# Patient Record
Sex: FEMALE | Race: OTHER | Employment: STUDENT | URBAN - METROPOLITAN AREA
[De-identification: names, ages, dates, MRNs, and addresses within clinical notes are randomized per-mention and may not be internally consistent; named-entity substitution may affect disease eponyms.]

---
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
## 2025-01-19 ENCOUNTER — HOSPITAL ENCOUNTER (OUTPATIENT)
Age: 20
Discharge: HOME | End: 2025-01-19
Payer: COMMERCIAL

## 2025-01-19 ENCOUNTER — APPOINTMENT (OUTPATIENT)
Dept: GENERAL RADIOLOGY | Age: 20
End: 2025-01-19
Attending: NURSE PRACTITIONER
Payer: COMMERCIAL

## 2025-01-19 VITALS
HEIGHT: 63 IN | BODY MASS INDEX: 19.49 KG/M2 | RESPIRATION RATE: 20 BRPM | HEART RATE: 103 BPM | OXYGEN SATURATION: 99 % | SYSTOLIC BLOOD PRESSURE: 126 MMHG | WEIGHT: 110 LBS | TEMPERATURE: 99 F | DIASTOLIC BLOOD PRESSURE: 83 MMHG

## 2025-01-19 DIAGNOSIS — R05.1 ACUTE COUGH: ICD-10-CM

## 2025-01-19 DIAGNOSIS — R68.83 CHILLS: ICD-10-CM

## 2025-01-19 DIAGNOSIS — J11.1 INFLUENZA: Primary | ICD-10-CM

## 2025-01-19 LAB
POCT INFLUENZA A: POSITIVE
POCT INFLUENZA B: NEGATIVE
SARS-COV-2 RNA RESP QL NAA+PROBE: NOT DETECTED

## 2025-01-19 PROCEDURE — 87502 INFLUENZA DNA AMP PROBE: CPT | Performed by: NURSE PRACTITIONER

## 2025-01-19 PROCEDURE — 99214 OFFICE O/P EST MOD 30 MIN: CPT | Performed by: NURSE PRACTITIONER

## 2025-01-19 PROCEDURE — 71046 X-RAY EXAM CHEST 2 VIEWS: CPT | Performed by: NURSE PRACTITIONER

## 2025-01-19 PROCEDURE — U0002 COVID-19 LAB TEST NON-CDC: HCPCS | Performed by: NURSE PRACTITIONER

## 2025-01-19 RX ORDER — ALBUTEROL SULFATE 90 UG/1
2 INHALANT RESPIRATORY (INHALATION) EVERY 4 HOURS PRN
COMMUNITY
Start: 2024-04-01

## 2025-01-19 NOTE — DISCHARGE INSTRUCTIONS
Increase water intake 2-3 liters daily   Your dose of acetaminophen (Tylenol) is 650 mg every 4 hours for pain or fevers as needed.     Your dose of ibuprofen is 400 mg every 6 hours for pain or fevers as needed.        Replace your toothbrush

## 2025-01-19 NOTE — ED PROVIDER NOTES
Patient Seen in: [facility]      History     Chief Complaint   Patient presents with    Cough/URI     Stated Complaint: cough; chest pain; head pressure. Pneumonia exposure.    Subjective:   HPI      19-year-old female presents today with complaints of chills cough and chest discomfort with head pressure that began last night.  Patient states that her brother was diagnosed with pneumonia yesterday.    Objective:     Past Medical History:    Asthma (HCC)    Bell's palsy              History reviewed. No pertinent surgical history.             No pertinent social history.            Review of Systems   Constitutional:  Positive for chills.   HENT:  Positive for congestion and sinus pressure.    Eyes: Negative.    Respiratory:  Positive for cough.    Cardiovascular: Negative.    Gastrointestinal: Negative.    Endocrine: Negative.    Genitourinary: Negative.    Musculoskeletal: Negative.    Skin: Negative.    Allergic/Immunologic: Negative.    Neurological: Negative.    Hematological: Negative.    Psychiatric/Behavioral: Negative.         Positive for stated complaint: cough; chest pain; head pressure. Pneumonia exposure.  Other systems are as noted in HPI.  Constitutional and vital signs reviewed.      All other systems reviewed and negative except as noted above.    Physical Exam     ED Triage Vitals [01/19/25 1236]   /83   Pulse 103   Resp 20   Temp 99.1 °F (37.3 °C)   Temp src Oral   SpO2 99 %   O2 Device None (Room air)       Current Vitals:   Vital Signs  BP: 126/83  Pulse: 103  Resp: 20  Temp: 99.1 °F (37.3 °C)  Temp src: Oral    Oxygen Therapy  SpO2: 99 %  O2 Device: None (Room air)        Physical Exam  Vitals and nursing note reviewed.   Constitutional:       Appearance: Normal appearance. She is normal weight.   HENT:      Head: Normocephalic.      Right Ear: External ear normal.      Left Ear: External ear normal.   Eyes:      Extraocular Movements: Extraocular movements intact.       Conjunctiva/sclera: Conjunctivae normal.      Pupils: Pupils are equal, round, and reactive to light.   Cardiovascular:      Rate and Rhythm: Normal rate and regular rhythm.      Pulses: Normal pulses.      Heart sounds: Normal heart sounds.   Pulmonary:      Effort: Pulmonary effort is normal.      Breath sounds: Normal breath sounds.   Musculoskeletal:      Cervical back: Normal range of motion and neck supple.   Neurological:      General: No focal deficit present.      Mental Status: She is alert.   Psychiatric:         Mood and Affect: Mood normal.           ED Course     Labs Reviewed   POCT FLU TEST - Abnormal; Notable for the following components:       Result Value    POCT INFLUENZA A Positive (*)     All other components within normal limits    Narrative:     This assay is a rapid molecular in vitro test utilizing nucleic acid amplification of influenza A and B viral RNA.   RAPID SARS-COV-2 BY PCR - Normal                   MDM        19-year-old female presents today with complaints of chills cough and chest discomfort with head pressure that began last night.  Patient states that her brother was diagnosed with pneumonia yesterday.  Vital signs: Please see EMR.  Physical exam: Please see exam.  Differential diagnosis: COVID, flu, pneumonia, bronchitis.    Recent Results (from the past 24 hours)   Rapid SARS-CoV-2 by PCR    Collection Time: 01/19/25 12:56 PM    Specimen: Nares; Other   Result Value Ref Range    Rapid SARS-CoV-2 by PCR Not Detected Not Detected   POCT Flu Test    Collection Time: 01/19/25 12:56 PM    Specimen: Nares; Other   Result Value Ref Range    POCT INFLUENZA A Positive (A) Negative    POCT INFLUENZA B Negative Negative   XR CHEST PA + LAT CHEST (CPT=71046)    Result Date: 1/19/2025  CONCLUSION:  No consolidation.   LOCATION:  [facility]   Dictated by (CST): Doni Mcdaniels MD on 1/19/2025 at 1:22 PM     Finalized by (CST): Doni Mcdaniels MD on 1/19/2025 at 1:23 PM      Based on  physical exam and HPI will diagnose influenza.  Patient instructed to treat symptoms supportively with rest hydration Tylenol Motrin and to replace her toothbrush.      Medical Decision Making  19-year-old female presents today with complaints of chills cough and chest discomfort with head pressure that began last night.  Patient states that her brother was diagnosed with pneumonia yesterday.    Problems Addressed:  Acute cough: acute illness or injury  Chills: acute illness or injury  Influenza: acute illness or injury    Amount and/or Complexity of Data Reviewed  Labs: ordered. Decision-making details documented in ED Course.  Radiology: ordered. Decision-making details documented in ED Course.    Risk  OTC drugs.        Disposition and Plan     Clinical Impression:  1. Influenza    2. Acute cough    3. Chills         Disposition:  Discharge  1/19/2025  1:26 pm    Follow-up:  [facility]  [address] [address]  [phone]    In 1 week            Medications Prescribed:  Current Discharge Medication List              Supplementary Documentation:

## 2025-01-19 NOTE — ED INITIAL ASSESSMENT (HPI)
Pt sts cough, chest pain/tightness, feeling hot, head pressure began yesterday. Brother with pneumonia.

## 2025-04-21 ENCOUNTER — HOSPITAL ENCOUNTER (OUTPATIENT)
Age: 20
Discharge: HOME | End: 2025-04-21
Payer: COMMERCIAL

## 2025-04-21 VITALS
OXYGEN SATURATION: 100 % | HEIGHT: 63 IN | SYSTOLIC BLOOD PRESSURE: 105 MMHG | HEART RATE: 69 BPM | RESPIRATION RATE: 16 BRPM | TEMPERATURE: 98 F | BODY MASS INDEX: 23.04 KG/M2 | DIASTOLIC BLOOD PRESSURE: 77 MMHG | WEIGHT: 130 LBS

## 2025-04-21 DIAGNOSIS — R19.7 DIARRHEA, UNSPECIFIED TYPE: ICD-10-CM

## 2025-04-21 DIAGNOSIS — K52.9 GASTROENTERITIS: Primary | ICD-10-CM

## 2025-04-21 LAB
#MXD IC: 0.7 X10ˆ3/UL (ref 0.1–1)
B-HCG UR QL: NEGATIVE
BILIRUB UR QL STRIP: NEGATIVE
BUN BLD-MCNC: 10 MG/DL (ref 7–18)
CHLORIDE BLD-SCNC: 105 MMOL/L (ref 98–112)
CLARITY UR: CLEAR
CO2 BLD-SCNC: 26 MMOL/L (ref 21–32)
COLOR UR: YELLOW
CREAT BLD-MCNC: 0.8 MG/DL (ref 0.55–1.02)
EGFRCR SERPLBLD CKD-EPI 2021: 108 ML/MIN/1.73M2 (ref 60–?)
GLUCOSE BLD-MCNC: 76 MG/DL (ref 70–99)
GLUCOSE UR STRIP-MCNC: NEGATIVE MG/DL
HCT VFR BLD AUTO: 44.9 % (ref 35–48)
HCT VFR BLD CALC: 43 % (ref 34–50)
HGB BLD-MCNC: 14.9 G/DL (ref 12–16)
HGB UR QL STRIP: NEGATIVE
ISTAT IONIZED CALCIUM FOR CHEM 8: 1.17 MMOL/L (ref 1.12–1.32)
KETONES UR STRIP-MCNC: NEGATIVE MG/DL
LEUKOCYTE ESTERASE UR QL STRIP: NEGATIVE
LYMPHOCYTES # BLD AUTO: 1.8 X10ˆ3/UL (ref 1–4)
LYMPHOCYTES NFR BLD AUTO: 39.1 %
MCH RBC QN AUTO: 27.4 PG (ref 26–34)
MCHC RBC AUTO-ENTMCNC: 33.2 G/DL (ref 31–37)
MCV RBC AUTO: 82.5 FL (ref 80–100)
MIXED CELL %: 14.7 %
NEUTROPHILS # BLD AUTO: 2.2 X10ˆ3/UL (ref 1.5–7.7)
NEUTROPHILS NFR BLD AUTO: 46.2 %
NITRITE UR QL STRIP: NEGATIVE
PH UR STRIP: 5.5 [PH]
PLATELET # BLD AUTO: 275 X10ˆ3/UL (ref 150–450)
POTASSIUM BLD-SCNC: 5 MMOL/L (ref 3.6–5.1)
PROT UR STRIP-MCNC: NEGATIVE MG/DL
RBC # BLD AUTO: 5.44 X10ˆ6/UL (ref 3.8–5.3)
SODIUM BLD-SCNC: 139 MMOL/L (ref 136–145)
SP GR UR STRIP: 1.02
TROPONIN I BLD-MCNC: 0.02 NG/ML (ref ?–0.05)
UROBILINOGEN UR STRIP-ACNC: <2 MG/DL
WBC # BLD AUTO: 4.7 X10ˆ3/UL (ref 4–11)

## 2025-04-21 PROCEDURE — 81025 URINE PREGNANCY TEST: CPT | Performed by: PHYSICIAN ASSISTANT

## 2025-04-21 PROCEDURE — 81002 URINALYSIS NONAUTO W/O SCOPE: CPT | Performed by: PHYSICIAN ASSISTANT

## 2025-04-21 PROCEDURE — 85025 COMPLETE CBC W/AUTO DIFF WBC: CPT | Performed by: PHYSICIAN ASSISTANT

## 2025-04-21 PROCEDURE — 80047 BASIC METABLC PNL IONIZED CA: CPT | Performed by: PHYSICIAN ASSISTANT

## 2025-04-21 PROCEDURE — 84484 ASSAY OF TROPONIN QUANT: CPT | Performed by: PHYSICIAN ASSISTANT

## 2025-04-21 PROCEDURE — 93000 ELECTROCARDIOGRAM COMPLETE: CPT | Performed by: PHYSICIAN ASSISTANT

## 2025-04-21 PROCEDURE — 99204 OFFICE O/P NEW MOD 45 MIN: CPT | Performed by: PHYSICIAN ASSISTANT

## 2025-04-21 RX ORDER — SODIUM CHLORIDE 9 MG/ML
1000 INJECTION, SOLUTION INTRAVENOUS ONCE
Status: COMPLETED | OUTPATIENT
Start: 2025-04-21 | End: 2025-04-21

## 2025-04-21 NOTE — ED PROVIDER NOTES
Patient Seen in: [facility]      History     Chief Complaint   Patient presents with    Nausea    Diarrhea     Stated Complaint: Diarrhea; Almost Passed Out Yesterday    Subjective:   The history is provided by the patient.       20-year-old female with past history of asthma presents to immediate care due to diarrhea for the past 3 days.  Patient endorsing multiple bouts of watery nonbloody nontarry diarrhea for the past 3 days.  Has had only 2 bouts today.  There is no associated abdominal pain but \"excessive growling\" of her stomach.  Was feeling nauseous yesterday but none today.  No true vomiting.  Tolerated breakfast today including a yogurt and eggs.  Denies any fevers or URI type symptoms.  Has had a history of \" abdominal issues in the past\".  Did start taking a probiotic with no relief.  Last menstrual cycle was 1 week ago and normal unsure of pregnancy.  No urinary complaints.      Yesterday while at [employer] she developed generalized tingling heart racing and short of breath.  Felt as if she was going to pass out no true syncope.  No true chest pain.  The symptoms dissipated after she sat and rested.  Admits she was feeling hot unsure if this was the cause.  No history of syncopal issues in the past.  Currently denies any chest pain, shortness of breath, palpitations.    No previous abdominal surgeries  No recent travel, antibiotic use or hospitalizations.    No known sick contacts.  History of Present Illness               Objective:     Past Medical History:    Asthma (HCC)    Bell's palsy              History reviewed. No pertinent surgical history.             No pertinent social history.            Review of Systems   Constitutional: Negative.    HENT: Negative.     Respiratory: Negative.     Cardiovascular: Negative.    Gastrointestinal: Negative.    Genitourinary: Negative.    Musculoskeletal: Negative.    Skin: Negative.    Neurological:  Positive for light-headedness. Negative for  dizziness, tremors, syncope, weakness and headaches.       Positive for stated complaint: Diarrhea; Almost Passed Out Yesterday  Other systems are as noted in HPI.  Constitutional and vital signs reviewed.      All other systems reviewed and negative except as noted above.                  Physical Exam     ED Triage Vitals [04/21/25 1253]   /77   Pulse 69   Resp 16   Temp 98.2 °F (36.8 °C)   Temp src Oral   SpO2 100 %   O2 Device None (Room air)       Current Vitals:   Vital Signs  BP: 105/77  Pulse: 69  Resp: 16  Temp: 98.2 °F (36.8 °C)  Temp src: Oral    Oxygen Therapy  SpO2: 100 %  O2 Device: None (Room air)        Physical Exam  Vitals and nursing note reviewed.   Constitutional:       General: She is not in acute distress.     Appearance: Normal appearance.   HENT:      Right Ear: Tympanic membrane, ear canal and external ear normal.      Left Ear: Tympanic membrane, ear canal and external ear normal.      Mouth/Throat:      Mouth: Mucous membranes are moist.   Eyes:      Extraocular Movements: Extraocular movements intact.      Conjunctiva/sclera: Conjunctivae normal.      Pupils: Pupils are equal, round, and reactive to light.   Cardiovascular:      Rate and Rhythm: Normal rate and regular rhythm.   Pulmonary:      Effort: Pulmonary effort is normal.      Breath sounds: Normal breath sounds.   Abdominal:      General: Bowel sounds are normal. There is no distension.      Palpations: Abdomen is soft.      Tenderness: There is no abdominal tenderness. There is no guarding.   Musculoskeletal:         General: Normal range of motion.   Skin:     General: Skin is warm.   Neurological:      General: No focal deficit present.      Mental Status: She is alert and oriented to person, place, and time.   Psychiatric:         Mood and Affect: Mood normal.         Behavior: Behavior normal.           Physical Exam                ED Course     Labs Reviewed   POCT CBC - Abnormal; Notable for the following  components:       Result Value    RBC IC 5.44 (*)     All other components within normal limits   POCT PREGNANCY URINE - Normal   POCT ISTAT CHEM8 CARTRIDGE - Normal   ISTAT TROPONIN - Normal   [facility] POCT URINALYSIS DIPSTICK     EKG    Rate, intervals and axes as noted on EKG Report.  Rate: 57bpm  Rhythm: Sinus bradycardia  Reading:  sinus bradycardia, QTc 385.  No STEMI              Results                                 MDM   Ddx -viral gastroenteritis, colitis, diverticulitis, appendicitis, IBS, vasovagal, orthostatic hypotension, dehydration, electrolyte imbalance, pregnancy    On exam the patient is afebrile nontoxic she is in no acute distress.  Her vital signs are stable.  Her abdomen is completely soft and nontender.  She tolerated p.o. intake already.  Patient was given IV fluids.  CBC unremarkable.  I-STAT unremarkable.  Urine shows no signs of dehydration or infection.  Negative pregnancy.  Due to this presyncopal episode yesterday EKG was performed showing sinus bradycardia no acute findings.  Troponin within normal limits.  Vasovagal etiology versus dehydration.  Clinically I do not believe this patient needs advanced imaging at this time.  Will continue to have her use probiotics bland diet push hydration.  Strict ER precautions were discussed all questions were answered and the patient is comfortable treatment plan discharge home        Medical Decision Making  Problems Addressed:  Diarrhea, unspecified type: acute illness or injury  Gastroenteritis: acute illness or injury    Amount and/or Complexity of Data Reviewed  Labs: ordered. Decision-making details documented in ED Course.  ECG/medicine tests: ordered and independent interpretation performed. Decision-making details documented in ED Course.     Details: Sinus marisel    Risk  OTC drugs.        Disposition and Plan     Clinical Impression:  1. Gastroenteritis    2. Diarrhea, unspecified type         Disposition:  There is no disposition on file  for this visit.  There is no disposition time on file for this visit.    Follow-up:  [facility]  [address] [address] [phone]                Medications Prescribed:  Current Discharge Medication List          Supplementary Documentation:

## 2025-04-21 NOTE — ED INITIAL ASSESSMENT (HPI)
Pt c/o having diarrhea x 3 days. Started a probiotic 2 days ago but that is not helping. Has nausea but no vomiting or fevers. Felt like she was going to pass out at [employer] yesterday.

## 2025-04-21 NOTE — DISCHARGE INSTRUCTIONS
EKG looks normal.  Your lab work is reassuring.  Symptoms could be related to a viral etiology continue with your probiotic.  [place] diet such as bananas rice applesauce toast  Close follow-up with your primary care doctor  Return to ER symptoms worsen

## 2025-04-25 LAB
ATRIAL RATE: 57 BPM
P AXIS: 49 DEGREES
P-R INTERVAL: 136 MS
Q-T INTERVAL: 396 MS
QRS DURATION: 80 MS
QTC CALCULATION (BEZET): 385 MS
R AXIS: 56 DEGREES
T AXIS: 45 DEGREES
VENTRICULAR RATE: 57 BPM

## (undated) NOTE — LETTER
Date & Time: 4/21/2025, 2:01 PM  Patient: Anneliese Ruiz  Encounter Provider(s):    Faith Chandra PA       To Whom It May Concern:    Anneliese Ruiz was seen and treated in our department on 4/21/2025. She should not return to school until 04/22/25 .    If you have any questions or concerns, please do not hesitate to call.        _____________________________  Physician/APC Signature

## (undated) NOTE — LETTER
Date & Time: 1/19/2025, 1:14 PM  Patient: Anneliese Ruiz  Encounter Provider(s):    Grace Barillas APRN       To Whom It May Concern:    Anneliese Ruiz was seen and treated in our department on 1/19/2025. She can return to work when fever free for 24 hours.    If you have any questions or concerns, please do not hesitate to call.        _____________________________  Physician/APC Signature

## (undated) NOTE — LETTER
Date & Time: 4/21/2025, 2:44 PM  Patient: Anneliese Ruiz  Encounter Provider(s):    Faith Chandra PA       To Whom It May Concern:    Anneliese Ruiz was seen and treated in our department on 4/21/2025. She should not return to school until 4/22/2025 .    If you have any questions or concerns, please do not hesitate to call.        Electronically signed: MICHELLE Don